# Patient Record
Sex: MALE | Race: OTHER | HISPANIC OR LATINO | Employment: FULL TIME | URBAN - METROPOLITAN AREA
[De-identification: names, ages, dates, MRNs, and addresses within clinical notes are randomized per-mention and may not be internally consistent; named-entity substitution may affect disease eponyms.]

---

## 2022-10-10 ENCOUNTER — EH NON-PROVIDER (OUTPATIENT)
Dept: OCCUPATIONAL MEDICINE | Facility: CLINIC | Age: 37
End: 2022-10-10

## 2022-10-10 ENCOUNTER — NON-PROVIDER VISIT (OUTPATIENT)
Dept: OCCUPATIONAL MEDICINE | Facility: CLINIC | Age: 37
End: 2022-10-10

## 2022-10-10 DIAGNOSIS — Z02.89 ENCOUNTER FOR OCCUPATIONAL HEALTH ASSESSMENT: ICD-10-CM

## 2022-10-10 PROCEDURE — 94375 RESPIRATORY FLOW VOLUME LOOP: CPT | Performed by: NURSE PRACTITIONER

## 2022-10-13 ENCOUNTER — OFFICE VISIT (OUTPATIENT)
Dept: URGENT CARE | Facility: CLINIC | Age: 37
End: 2022-10-13

## 2022-10-13 VITALS
OXYGEN SATURATION: 96 % | HEIGHT: 70 IN | RESPIRATION RATE: 18 BRPM | DIASTOLIC BLOOD PRESSURE: 90 MMHG | BODY MASS INDEX: 36.52 KG/M2 | TEMPERATURE: 97.1 F | HEART RATE: 78 BPM | SYSTOLIC BLOOD PRESSURE: 148 MMHG | WEIGHT: 255.1 LBS

## 2022-10-13 DIAGNOSIS — J01.40 ACUTE PANSINUSITIS, RECURRENCE NOT SPECIFIED: ICD-10-CM

## 2022-10-13 PROCEDURE — 99203 OFFICE O/P NEW LOW 30 MIN: CPT

## 2022-10-13 RX ORDER — DOXYCYCLINE HYCLATE 100 MG
100 TABLET ORAL 2 TIMES DAILY
Qty: 14 TABLET | Refills: 0 | Status: SHIPPED | OUTPATIENT
Start: 2022-10-13 | End: 2022-10-20

## 2022-10-13 ASSESSMENT — ENCOUNTER SYMPTOMS
STRIDOR: 0
MYALGIAS: 0
CHILLS: 0
SHORTNESS OF BREATH: 0
SPUTUM PRODUCTION: 0
FEVER: 0
WEIGHT LOSS: 0
SINUS PAIN: 1
COUGH: 0
SORE THROAT: 1
DIAPHORESIS: 0
HEADACHES: 1
WHEEZING: 0

## 2022-10-14 NOTE — PROGRESS NOTES
"Subjective:   Vivek Navarro is a 36 y.o. male who presents for Pharyngitis (Fatigue/body aches/yellow/bloody phlegm/x8days)      HPI: this is a 36-year-old male who presents today for nasal congestion, postnasal drip, sinus pain and pressure.  Patient reports developing URI over 1 week ago after exposure from daughter who was recently sick with a cold.  He reports having continued nasal congestion with postnasal drip and throat irritation.  He has been taking DayQuil and NyQuil for symptoms without any improvement.  He denies fevers, chills, body aches.  He reports history of allergies and sinus infections in the past with same presentation.      Review of Systems   Constitutional:  Negative for chills, diaphoresis, fever, malaise/fatigue and weight loss.   HENT:  Positive for congestion, sinus pain and sore throat. Negative for ear discharge and ear pain.         Positive for postnasal drip   Respiratory:  Negative for cough, sputum production, shortness of breath, wheezing and stridor.    Musculoskeletal:  Negative for myalgias.   Neurological:  Positive for headaches.   All other systems reviewed and are negative.    Medications:    No current outpatient medications on file prior to visit.     No current facility-administered medications on file prior to visit.        Allergies:   Penicillins    Problem List:   There is no problem list on file for this patient.       Surgical History:  No past surgical history on file.    Past Social Hx:   Social History     Tobacco Use    Smoking status: Never    Smokeless tobacco: Never   Vaping Use    Vaping Use: Never used   Substance Use Topics    Alcohol use: Not Currently    Drug use: Never          Problem list, medications, and allergies reviewed by myself today in Epic.     Objective:     BP (!) 148/90 (BP Location: Left arm, Patient Position: Sitting)   Pulse 78   Temp 36.2 °C (97.1 °F) (Temporal)   Resp 18   Ht 1.778 m (5' 10\")   Wt 116 kg (255 lb 1.6 oz)   " SpO2 96%   BMI 36.60 kg/m²     Physical Exam  Vitals and nursing note reviewed.   Constitutional:       General: He is awake. He is not in acute distress.     Appearance: Normal appearance. He is normal weight. He is not ill-appearing, toxic-appearing or diaphoretic.   HENT:      Head: Normocephalic and atraumatic.      Right Ear: Tympanic membrane, ear canal and external ear normal. There is no impacted cerumen.      Left Ear: Tympanic membrane, ear canal and external ear normal. There is no impacted cerumen.      Nose:      Right Sinus: Maxillary sinus tenderness present.      Left Sinus: Maxillary sinus tenderness present.      Mouth/Throat:      Mouth: Mucous membranes are moist.      Pharynx: Oropharynx is clear. Posterior oropharyngeal erythema present. No oropharyngeal exudate.   Cardiovascular:      Rate and Rhythm: Normal rate and regular rhythm.      Pulses: Normal pulses.      Heart sounds: Normal heart sounds. No murmur heard.    No friction rub. No gallop.   Pulmonary:      Effort: Pulmonary effort is normal. No respiratory distress.      Breath sounds: Normal breath sounds. No stridor. No wheezing, rhonchi or rales.   Chest:      Chest wall: No tenderness.   Musculoskeletal:      Cervical back: Normal range of motion and neck supple. No tenderness.   Lymphadenopathy:      Cervical: No cervical adenopathy.   Skin:     General: Skin is warm and dry.      Capillary Refill: Capillary refill takes less than 2 seconds.   Neurological:      General: No focal deficit present.      Mental Status: He is alert and oriented to person, place, and time. Mental status is at baseline.   Psychiatric:         Mood and Affect: Mood normal.         Behavior: Behavior normal. Behavior is cooperative.         Thought Content: Thought content normal.         Judgment: Judgment normal.       Assessment/Plan:     Diagnosis and associated orders:   1. Acute pansinusitis, recurrence not specified  doxycycline (VIBRAMYCIN) 100  MG Tab          Comments/MDM:   Pt is clinically stable at today's acute urgent care visit.  No acute distress noted. Appropriate for outpatient management at this time.     Acute problem.  Patient is not ill or toxic appearing in clinic today.  Symptoms consistent with acute pansinusitis.  He does have penicillin allergy.  He will be prescribed doxycycline 100 mg twice daily x7 days.  Advised patient to begin taking antibiotic therapy as prescribed, increase oral hydration to include warm fluids, alternate Tylenol with ibuprofen as needed for associated headaches and pain.  He is to return for any new or worsening signs or symptoms.  Patient agreeable for plan of care and verbalizes good understanding today.             Discussed DDx, management options (risks,benefits, and alternatives to planned treatment), natural progression and supportive care.  Expressed understanding and the treatment plan was agreed upon. Questions were encouraged and answered   Return to urgent care prn if new or worsening sx or if there is no improvement in condition prn.    Educated in Red flags and indications to immediately call 911 or present to the Emergency Department.   Advised the patient to follow-up with the primary care physician for recheck, reevaluation, and consideration of further management.    I personally reviewed prior external notes and test results pertinent to today's visit.  I have independently reviewed and interpreted all diagnostics ordered during this urgent care acute visit.       Please note that this dictation was created using voice recognition software. I have made a reasonable attempt to correct obvious errors, but I expect that there are errors of grammar and possibly content that I did not discover before finalizing the note.    This note was electronically signed by JAMES Hernandez